# Patient Record
Sex: FEMALE | Race: WHITE | Employment: OTHER | URBAN - METROPOLITAN AREA
[De-identification: names, ages, dates, MRNs, and addresses within clinical notes are randomized per-mention and may not be internally consistent; named-entity substitution may affect disease eponyms.]

---

## 2021-06-16 PROBLEM — I48.0 PAROXYSMAL ATRIAL FIBRILLATION (HCC): Status: ACTIVE | Noted: 2021-06-16

## 2021-06-16 NOTE — PROGRESS NOTES
[facility]   Electrophysiology Consultation   Date: 6/17/2021  Reason for Consultation: atrial fibrillation/flutter- establish care  Consult Requesting Physician: Dr. Josiah Harris      Chief Complaint   Patient presents with    New Patient     Hx of a-fib     Atrial Fibrillation        HPI: Shereen Raphael is a 42 y.o. Transgender individual who is transitioning to female on estrogen With a past medical history of paroxysmal atrial fibrillation diagnosed 02/2019. Before ablation her palpitations with significant enough that she could not sleep at night. Continued to have PAF despite high does flecainide. 06/27/2019 successful PVI for atrial fibrillation  And CTI ablation for additional SVT. Mo Bernard has recently moved to the area and would like to establish care. She states she has not had any atrial fibrillation but she has been having some short palpitations. She states her watch has not  anything. She states she is not experiencing any side effects from flecainide. Past Medical History:   Diagnosis Date    Atrial fibrillation ([address]     H/O knee surgery     Male-to-female transgender person         Past Surgical History:   Procedure Laterality Date    KNEE SURGERY         Allergies:  No Known Allergies    Social History:   reports that she quit smoking about 7 years ago. Her smoking use included cigarettes. She has a 15.00 pack-year smoking history. She has never used smokeless tobacco. She reports current alcohol use. Family History:  family history includes No Known Problems in her father; Stroke in her mother. Reviewed. Denies family history of sudden cardiac death, arrhythmia, premature CAD    Review of System:  All other systems reviewed and are negative except for that noted above.  Pertinent negatives are:   General: negative for fever, chills   Ophthalmic ROS: negative for - eye pain or loss of vision  ENT ROS: negative for - headaches, sore mechanism. Stress  Echo : 02/26/2019 [facility] Radha MCGILL ( media)  Baseline EKG normal sinus Rhythm   Stress EKG impression : abnormal EKG   Stress test conclusion: negative stress test     Medication:  Current Outpatient Medications   Medication Sig Dispense Refill    dilTIAZem (CARDIZEM CD) 120 MG extended release capsule Take 1 capsule by mouth daily 90 capsule 3    flecainide (TAMBOCOR) 50 MG tablet Take two in the evenings ( 100 mg )  and then 50 mg every other day in the morning. 225 tablet 1     No current facility-administered medications for this visit. Assessment and plan:     Paroxysmal atrial fibrillation/flutter   - EKG today Sinus rhythm    - diagnosed 02/219   - PVI with CTI 06/2019 and ablation of flutter CTI Jesica Destiny   - flecainide 100 mg BID and diltiazem 30 mg 3 times a day   - c/o \" skipped beats\" at last visit with - recommended monitor after establishing care in [place]    I discussed that there are different options including doing a monitor now to see what her palpitations are however she is not very symptomatic and wants to hold off on that. We can also consider decreasing or stopping her flecainide and see if A. fib recurs or becomes worse. But right now she is comfortable with her medical therapy. We will monitor for side effects of flecainide    I will change her short acting diltiazem to long-acting for better compliance. High BP without dx of hypertenison     - she has not been monitoring her BP at home. She will keep a log    Not well controlled  BP goal <130/80  Home BP monitoring encouraged, printed information provided on how to accurately measure BP at home. Counseled to follow a low salt diet to assure blood pressure remains controlled for cardiovascular risk factor modification. The patient is counseled to get regular exercise 3-5 times per week and maintain a healthy weight reduce cardiovascular risk factors.    Change cardizem to cartia  mg daily       Plan follow up in one year and as needed           Thank you for allowing me to participate in the care of Eyad Bradley. Further evaluation will be based upon the patient's clinical course and testing results. All questions and concerns were addressed to the patient/family. Alternatives to my treatment were discussed. I have discussed the above stated plan and the patient verbalized understanding and agreed with the plan. NOTE: This report was transcribed using voice recognition software. Every effort was made to ensure accuracy, however, inadvertent computerized transcription errors may be present. Balwinder Woods MD, [facility]   Office: [phone]    Scribe attestation:  This note was scribed in the presence of Balwinder Woods MD by Kiara Mccarty RN    I, Dr. Balwinder Woods personally performed the services described in this documentation as scribed by RN in my presence, and it is both accurate and complete.

## 2021-06-17 ENCOUNTER — OFFICE VISIT (OUTPATIENT)
Dept: CARDIOLOGY CLINIC | Age: 41
End: 2021-06-17

## 2021-06-17 VITALS
SYSTOLIC BLOOD PRESSURE: 144 MMHG | BODY MASS INDEX: 26.63 KG/M2 | RESPIRATION RATE: 18 BRPM | WEIGHT: 179.8 LBS | HEART RATE: 76 BPM | HEIGHT: 69 IN | DIASTOLIC BLOOD PRESSURE: 85 MMHG

## 2021-06-17 DIAGNOSIS — R03.0 ELEVATED BP WITHOUT DIAGNOSIS OF HYPERTENSION: ICD-10-CM

## 2021-06-17 DIAGNOSIS — I48.0 PAROXYSMAL ATRIAL FIBRILLATION (HCC): Primary | ICD-10-CM

## 2021-06-17 DIAGNOSIS — Z51.81 ENCOUNTER FOR MONITORING FLECAINIDE THERAPY: ICD-10-CM

## 2021-06-17 DIAGNOSIS — Z79.899 ENCOUNTER FOR MONITORING FLECAINIDE THERAPY: ICD-10-CM

## 2021-06-17 PROCEDURE — 99204 OFFICE O/P NEW MOD 45 MIN: CPT | Performed by: INTERNAL MEDICINE

## 2021-06-17 PROCEDURE — 93000 ELECTROCARDIOGRAM COMPLETE: CPT | Performed by: INTERNAL MEDICINE

## 2021-06-17 RX ORDER — FLECAINIDE ACETATE 50 MG/1
TABLET ORAL
COMMUNITY
Start: 2021-05-08 | End: 2021-06-17

## 2021-06-17 RX ORDER — FLECAINIDE ACETATE 50 MG/1
TABLET ORAL
Qty: 225 TABLET | Refills: 1 | Status: SHIPPED | OUTPATIENT
Start: 2021-06-17 | End: 2021-07-06 | Stop reason: SDUPTHER

## 2021-06-17 RX ORDER — DILTIAZEM HYDROCHLORIDE 120 MG/1
120 CAPSULE, COATED, EXTENDED RELEASE ORAL DAILY
Qty: 90 CAPSULE | Refills: 3 | Status: SHIPPED | OUTPATIENT
Start: 2021-06-17 | End: 2022-05-30 | Stop reason: SDUPTHER

## 2021-07-06 ENCOUNTER — TELEPHONE (OUTPATIENT)
Dept: CARDIOLOGY CLINIC | Age: 41
End: 2021-07-06

## 2021-07-06 RX ORDER — FLECAINIDE ACETATE 50 MG/1
TABLET ORAL
Qty: 225 TABLET | Refills: 1 | Status: SHIPPED | OUTPATIENT
Start: 2021-07-06 | End: 2021-12-17 | Stop reason: SDUPTHER

## 2021-07-06 NOTE — TELEPHONE ENCOUNTER
Pharmacy told patient they never got flecainide rx on 6/17/21 .  Please send rx again to [facility] [address]

## 2021-12-17 NOTE — TELEPHONE ENCOUNTER
Received refill request for Flecainide 50 mg from [facility]. Last OV: 6/17/21 [facility]    Last Labs: None    Last Refill: 7/6/21 #225 with 1 refill      Next Appt:  On recall list for 6/17/22 with NPAL

## 2021-12-18 RX ORDER — FLECAINIDE ACETATE 50 MG/1
TABLET ORAL
Qty: 225 TABLET | Refills: 1 | Status: SHIPPED | OUTPATIENT
Start: 2021-12-18 | End: 2022-08-31

## 2021-12-19 RX ORDER — FLECAINIDE ACETATE 50 MG/1
TABLET ORAL
Qty: 225 TABLET | Refills: 0 | Status: SHIPPED | OUTPATIENT
Start: 2021-12-19 | End: 2022-05-30 | Stop reason: SDUPTHER

## 2022-06-01 RX ORDER — DILTIAZEM HYDROCHLORIDE 120 MG/1
120 CAPSULE, COATED, EXTENDED RELEASE ORAL DAILY
Qty: 90 CAPSULE | Refills: 3 | Status: SHIPPED | OUTPATIENT
Start: 2022-06-01

## 2022-06-01 RX ORDER — FLECAINIDE ACETATE 50 MG/1
TABLET ORAL
Qty: 225 TABLET | Refills: 0 | Status: SHIPPED | OUTPATIENT
Start: 2022-06-01 | End: 2022-08-31 | Stop reason: SDUPTHER

## 2022-06-01 NOTE — TELEPHONE ENCOUNTER
Received refill request for Diltiazem 120mg & Tambocor 50mg from pt to [address] : 6/17/21 MXA    Last EKG : 6/17/21    Last Refill : Diltiazem 6/17/21 90 w/3 refills                     Tambocor 225 w/0 refills    Next OV : Recall listed 6/17/22 NPAL

## 2022-06-16 RX ORDER — FLECAINIDE ACETATE 50 MG/1
TABLET ORAL
Qty: 225 TABLET | Refills: 0 | OUTPATIENT
Start: 2022-06-16

## 2022-06-23 ENCOUNTER — TELEPHONE (OUTPATIENT)
Dept: CARDIOLOGY CLINIC | Age: 42
End: 2022-06-23

## 2022-06-23 NOTE — TELEPHONE ENCOUNTER
Pt was seen [facility] 2021, and was told to come back 1 yr to see NPAL, there are no openings for the 1 yr appt June/ July/ August  Pt has enough medications until the end of August , please provide date and time pt can get seen for medication refills.

## 2022-08-08 NOTE — PROGRESS NOTES
[facility]   Electrophysiology  SYD Thompson  Attending EP: Dr. Naveen Vega    Date: 8/31/2022  I had the privilege of visiting Hardik Ramírez in the office. Chief Complaint:   Chief Complaint   Patient presents with    Atrial Fibrillation    Palpitations    1 Year Follow Up     Pt reports feeling palpitations often     History of Present Illness: History obtained from patient and medical record. Hardik Ramírez is 36 y.o. female with a past medical history of PAF, palpitations. Hx of PVI AF, CTI ablation (6/27/19)    -Interval history: Today, Hardik Ramírez is being seen for annual EP follow up. She is doing pretty well. She states a few months ago her palpitations worsened. She denies any known triggers. She decided to increase her flecainide to 100 mg BID on her own, which has helped tremendously. She monitors her pulse at home with [facility]. Her symptoms appear to be with PACs. She does not smoke cigarettes or drink alcohol. She does drink one cup of coffee a day. She runs 4-5 days a week for 2-3 miles at a time without issue. 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 Pt reports she has had frequent urination recently. She does not have a PCP and has no recent labs. Denies having chest pain, palpitations, shortness of breath, orthopnea/PND, cough, or dizziness at the time of this visit. With regard to medication therapy the patient has been compliant with prescribed regimen. They have tolerated therapy to date. Allergies:  No Known Allergies    Home Medications:  Prior to Visit Medications    Medication Sig Taking?  Authorizing Provider   ASPIRIN 81 PO Take by mouth Yes Historical Provider, MD   Magnesium 500 MG CAPS Take by mouth Yes Historical Provider, MD   dilTIAZem (CARDIZEM CD) 120 MG extended release capsule Take 1 capsule by mouth daily Yes GENIA Thompson CNP   flecainide (TAMBOCOR) 50 MG tablet Take two in the evenings ( 100 mg )  and then 50 mg every other day in the morning. Yes GENIA Mckeon CNP   flecainide (TAMBOCOR) 50 MG tablet TAKE 2 TABLETS BY MOUTH EVERY EVENING AND TAKE 1 TABLET BY MOUTH EVERY OTHER DAY IN THE MORNING  Shaymarin GENIA Cosby CNP      Past Medical History:  Past Medical History:   Diagnosis Date    Atrial fibrillation [facility])     H/O knee surgery     Male-to-female transgender person      Past Surgical History:    has a past surgical history that includes knee surgery. Social History:  Personally Reviewed. reports that she quit smoking about 9 years ago. Her smoking use included cigarettes. She has a 15.00 pack-year smoking history. She has never used smokeless tobacco. She reports current alcohol use. Family History:  Personally Reviewed. family history includes No Known Problems in her father; Stroke in her mother. Review of Systems:  Constitutional: Negative for fever, night sweats, chills, weight changes, or weakness  Skin: Negative for rash, dry skin, pruritus, bruising, bleeding, blood clots, or changes in skin pigment  HEENT: Negative for vision changes, ringing in the ears, sore throat, dysphagia, or swollen lymph nodes  Respiratory: Reviewed in HPI  Cardiovascular: Reviewed in HPI  Gastrointestinal: Negative for abdominal pain, N/V/D, constipation, or black/tarry stools  Genito-Urinary: Positive for frequent urinartion. Negative for dysuria, incontinence, urgency, or hematuria  Musculoskeletal: Negative for joint swelling, muscle pain, or injuries  Neurological/Psych: Negative for confusion, seizures, dizziness, headaches, balance issues or TIA-like symptoms.  No anxiety, depression, or insomnia    Physical Examination:  Vitals:    08/31/22 0930   BP: 122/82   Pulse: 70   SpO2: 98%      Wt Readings from Last 3 Encounters:   08/31/22 178 lb (80.7 kg)   06/17/21 179 lb 12.8 oz (81.6 kg)     Constitutional: Cooperative and in no apparent distress, and appears well nourished  Skin: Warm and pink; no pallor, cyanosis, bruising, or clubbing  HEENT: Symmetric and normocephalic. PERRL, EOM intact. Conjunctiva pink with clear sclera. Mucus membranes pink and moist. Teeth intact. Thyroid smooth without nodules or goiter  Respiratory: Respirations symmetric and unlabored. Lungs clear to auscultation bilaterally, no wheezing, rhonchi, or crackles  Cardiovascular:  Regular rate and rhythm. S1/S2 present without murmurs, rubs, or gallops. Peripheral pulses 2+, capillary refill < 3 seconds. No elevation of JVP. No peripheral edema  Gastrointestinal: Abdomen soft and round. Bowel sounds normoactive in all quadrants without tenderness or masses. Musculoskeletal: Bilateral upper and lower extremity strength 5/5 with full ROM. Neurological/Psych: Awake and orientated to person, place and time. Calm affect, appropriate mood. Pertinent labs, diagnostic, device, and imaging results reviewed as a part of this visit    LABS    CBC: No results found for: WBC, HGB, HCT, MCV, PLT  BMP: No results found for: CREATININE, BUN, NA, K, CL, CO2  CrCl cannot be calculated (No successful lab value found. ). Thyroid: No results found for: TSH, D7TVBHM, B6TCYLC, THYROIDAB  Lipid Panel: No results found for: CHOL, HDL, TRIG  LFTs:No results found for: ALT, AST, GGT, ALKPHOS, BILITOT  Coags: No results found for: PROTIME, INR, APTT    EC2022 (Personally Interpreted)  - NSR. Rate 65, , QTc 400    Stress Echo: 2019 ([facility])  EF 55-60%    Baseline EKG normal sinus Rhythm   Stress EKG impression : abnormal EKG   Stress test conclusion: negative stress test     Assessment: 1. Paroxysmal Atrial Fibrillation  - Hx of PVI, CTI ablation of SVT (19)   - Currently in NSR  - Continue cardizem 120 mg QD  - Will continue flecainide 100 mg BID as her symptoms have improved   ~     - [id number] score:0 ; [id number] Vasc score and anticoagulation discussed.      - Afib risk factors including age, HTN, obesity, inactivity and TYRONE were discussed with patient. Risk factor modification recommended    2. Palpitation, PAC  - Etiology appears to be related to PACs based on [employer] mobile strips  - Improved on higher dose flecainide  - Pt declined monitoring at this time    - Encourage adequate hydration and avoidance of caffeine/triggers  - Recommend continuing exercise routine    - If palpitations worsen, may increase CCB or flecainide further or she could consider EP study and ablation in future, but she would like to hold off for now as her symptoms are better    3. Elevated BP  - Controlled; Goal <130/80  - Continue current medications  - Encouraged to monitor and log BP readings at home, then bring log to next visit  - Discussed importance of low sodium diet, weight control and exercise    4. Frequent Urination   - Recommend labs (Will order labs(CBC, CMP, TSH, UA); pt wants to check with local labs on cost of lab work)   - Obtain PCP for preventative care needs    Plan:  Continue flecainide 100 mg twice per day  Let us know if symptoms worsen  Remain hydrated, especially if exercising outside  Call if you want labs ordered    F/U: Follow-up with EP in 1 year or as needed  -Call Myrna at [phone] with any questions    Diet & Exercise: The patient is counseled to follow a low salt diet to assure blood pressure remains controlled for cardiovascular risk factor modification  The patient is counseled to avoid excess caffeine, and energy drinks as this may exacerbated ectopy and arrhythmia  The patient is counseled to lose weight to control cardiovascular risk factors  Exercise program discussed: To improve overall cardiovascular health, the patient is instructed to increase cardiovascular related activities with a goal of 150 min/week of moderate level activity or 10,000 steps per day.  Encouraged to perform as much activity as tolerated    I have addressed the patient's cardiac risk factors and adjusted pharmacologic treatment as

## 2022-08-31 ENCOUNTER — OFFICE VISIT (OUTPATIENT)
Dept: CARDIOLOGY CLINIC | Age: 42
End: 2022-08-31

## 2022-08-31 VITALS
BODY MASS INDEX: 26.36 KG/M2 | SYSTOLIC BLOOD PRESSURE: 122 MMHG | OXYGEN SATURATION: 98 % | HEART RATE: 70 BPM | HEIGHT: 69 IN | WEIGHT: 178 LBS | DIASTOLIC BLOOD PRESSURE: 82 MMHG

## 2022-08-31 DIAGNOSIS — R00.2 PALPITATION: ICD-10-CM

## 2022-08-31 DIAGNOSIS — I48.0 PAROXYSMAL ATRIAL FIBRILLATION (HCC): Primary | ICD-10-CM

## 2022-08-31 DIAGNOSIS — R03.0 ELEVATED BLOOD PRESSURE READING: ICD-10-CM

## 2022-08-31 DIAGNOSIS — R35.0 FREQUENT URINATION: ICD-10-CM

## 2022-08-31 PROCEDURE — 99214 OFFICE O/P EST MOD 30 MIN: CPT | Performed by: NURSE PRACTITIONER

## 2022-08-31 PROCEDURE — 93000 ELECTROCARDIOGRAM COMPLETE: CPT | Performed by: NURSE PRACTITIONER

## 2022-08-31 RX ORDER — FLECAINIDE ACETATE 100 MG/1
100 TABLET ORAL 2 TIMES DAILY
Qty: 180 TABLET | Refills: 3 | Status: SHIPPED | OUTPATIENT
Start: 2022-08-31

## 2022-08-31 RX ORDER — FOLIC ACID 0.8 MG
TABLET ORAL
COMMUNITY

## 2022-08-31 NOTE — PATIENT INSTRUCTIONS
Continue flecainide 100 mg twice per day  Let us know if symptoms worsen  Remain hydrated, especially if exercising outside

## 2023-03-22 NOTE — PROGRESS NOTES
[address]   Electrophysiology Follow up   Date: 3/23/2023  I had the privilege of visiting Vasile Starr in the office. CC: palpitations   HPI: Vasile Starr is a 39 y.o. adult  transitioning to female  with a past medical history of PAF, palpitations. Hx of PVI AF, CTI ablation (6/27/19)     Before ablation her palpitations with significant enough that she could not sleep at night. Continued to have PAF despite high does flecainide. 06/27/2019 successful PVI for atrial fibrillation  And CTI ablation for additional SVT     Last seen 08/31/2022 - at that time c/o worsening palpitations. She had increased her flecainide to 100 mg BID on  her own. She was monitoring her rhythm on [employer] mobile lucy. Symptoms were associated with PAC . Monitor was offered and declined     Interval History:  [employer] states her palpitations are getting worse. They are  intermittent  but will go on for  several hours and often keep her awake. She has tracings from her smart watch that show PACs. She is compliant with her flecainide and cardizem. She states in the fall she stopped exercising and her PACS stopped. She recently resumed running again and her PAC started up again. She runs 2-3 miles , 3-4 times a week. Assessment and plan:   Paroxysmal atrial fibrillation/flutter /PAC   EKG today  Sinus    Diagnosed 02/2019    S/p PVI with ablation of CTI 06/2019    Does not drink alcohol or smoke    Continue Cardizem 120 mg daily   Continue flecainide 100 mg BID       Smart watch  tracings show PACs. She is concerned about recurrence of her AFib. She declined a monitor. We discussed increasing her cardizem. She did not want to due that. Will add Cardizem 30 mg that she can take PRN 30-60 mg at night to control PAC . I explained to her that the amount of exercise that she is doing is not known to cause any arrhythmias. She can monitor her rhythm.   If she sees atrial fibrillation she can contact us

## 2023-03-23 ENCOUNTER — TELEPHONE (OUTPATIENT)
Dept: CARDIOLOGY CLINIC | Age: 43
End: 2023-03-23

## 2023-03-23 ENCOUNTER — OFFICE VISIT (OUTPATIENT)
Dept: CARDIOLOGY CLINIC | Age: 43
End: 2023-03-23
Payer: COMMERCIAL

## 2023-03-23 VITALS
OXYGEN SATURATION: 98 % | DIASTOLIC BLOOD PRESSURE: 90 MMHG | HEART RATE: 67 BPM | WEIGHT: 186.8 LBS | BODY MASS INDEX: 27.67 KG/M2 | HEIGHT: 69 IN | SYSTOLIC BLOOD PRESSURE: 124 MMHG

## 2023-03-23 DIAGNOSIS — R03.0 ELEVATED BLOOD PRESSURE READING: ICD-10-CM

## 2023-03-23 DIAGNOSIS — I49.1 PAC (PREMATURE ATRIAL CONTRACTION): ICD-10-CM

## 2023-03-23 DIAGNOSIS — R00.2 PALPITATION: ICD-10-CM

## 2023-03-23 DIAGNOSIS — I48.0 PAROXYSMAL ATRIAL FIBRILLATION (HCC): Primary | ICD-10-CM

## 2023-03-23 PROCEDURE — 99214 OFFICE O/P EST MOD 30 MIN: CPT | Performed by: INTERNAL MEDICINE

## 2023-03-23 PROCEDURE — G8419 CALC BMI OUT NRM PARAM NOF/U: HCPCS | Performed by: INTERNAL MEDICINE

## 2023-03-23 PROCEDURE — G8484 FLU IMMUNIZE NO ADMIN: HCPCS | Performed by: INTERNAL MEDICINE

## 2023-03-23 PROCEDURE — G8427 DOCREV CUR MEDS BY ELIG CLIN: HCPCS | Performed by: INTERNAL MEDICINE

## 2023-03-23 PROCEDURE — 93000 ELECTROCARDIOGRAM COMPLETE: CPT | Performed by: INTERNAL MEDICINE

## 2023-03-23 PROCEDURE — 1036F TOBACCO NON-USER: CPT | Performed by: INTERNAL MEDICINE

## 2023-03-23 RX ORDER — DESMOPRESSIN ACETATE 0.2 MG/1
TABLET ORAL NIGHTLY
COMMUNITY
Start: 2023-02-15

## 2023-03-23 NOTE — TELEPHONE ENCOUNTER
[address] [facility]. in [place] called wanting clarification regarding Diltiazem 30mg. They have 2 sets of directions. Is pt to take one tab daily or 1-2 tabs daily? Pharmacy ph# [phone]. Thanks.

## 2023-04-06 ENCOUNTER — PATIENT MESSAGE (OUTPATIENT)
Dept: CARDIOLOGY CLINIC | Age: 43
End: 2023-04-06

## 2023-04-06 NOTE — TELEPHONE ENCOUNTER
From: [facility]  To: Dr. Saulo Castillo  Sent: 4/6/2023 8:32 AM EDT  Subject: Bernadette Wiggins July. I was wondering if me getting breast implants is safe considering my history of afib.